# Patient Record
Sex: MALE | Race: AMERICAN INDIAN OR ALASKA NATIVE
[De-identification: names, ages, dates, MRNs, and addresses within clinical notes are randomized per-mention and may not be internally consistent; named-entity substitution may affect disease eponyms.]

---

## 2022-08-15 ENCOUNTER — HOSPITAL ENCOUNTER (EMERGENCY)
Dept: HOSPITAL 95 - ER | Age: 67
Discharge: HOME | End: 2022-08-15
Payer: MEDICARE

## 2022-08-15 VITALS — HEIGHT: 70 IN | BODY MASS INDEX: 42.95 KG/M2 | WEIGHT: 300.01 LBS

## 2022-08-15 DIAGNOSIS — F17.210: ICD-10-CM

## 2022-08-15 DIAGNOSIS — G47.30: ICD-10-CM

## 2022-08-15 DIAGNOSIS — J44.9: Primary | ICD-10-CM

## 2022-08-15 DIAGNOSIS — Z20.822: ICD-10-CM

## 2022-08-15 LAB
ALBUMIN SERPL BCP-MCNC: 3.6 G/DL (ref 3.4–5)
ALBUMIN/GLOB SERPL: 0.9 {RATIO} (ref 0.8–1.8)
ALT SERPL W P-5'-P-CCNC: 114 U/L (ref 12–78)
ANION GAP SERPL CALCULATED.4IONS-SCNC: 6 MMOL/L (ref 6–16)
AST SERPL W P-5'-P-CCNC: 58 U/L (ref 12–37)
BASOPHILS # BLD AUTO: 0.04 K/MM3 (ref 0–0.23)
BASOPHILS NFR BLD AUTO: 1 % (ref 0–2)
BILIRUB SERPL-MCNC: 0.5 MG/DL (ref 0.1–1)
BUN SERPL-MCNC: 36 MG/DL (ref 8–24)
CALCIUM SERPL-MCNC: 8.8 MG/DL (ref 8.5–10.1)
CHLORIDE SERPL-SCNC: 111 MMOL/L (ref 98–108)
CO2 SERPL-SCNC: 22 MMOL/L (ref 21–32)
CREAT SERPL-MCNC: 1.43 MG/DL (ref 0.6–1.2)
DEPRECATED RDW RBC AUTO: 48.6 FL (ref 35.1–46.3)
EOSINOPHIL # BLD AUTO: 0.14 K/MM3 (ref 0–0.68)
EOSINOPHIL NFR BLD AUTO: 2 % (ref 0–6)
ERYTHROCYTE [DISTWIDTH] IN BLOOD BY AUTOMATED COUNT: 14.4 % (ref 11.7–14.2)
FLUAV RNA SPEC QL NAA+PROBE: NEGATIVE
FLUBV RNA SPEC QL NAA+PROBE: NEGATIVE
GLOBULIN SER CALC-MCNC: 3.9 G/DL (ref 2.2–4)
GLUCOSE SERPL-MCNC: 113 MG/DL (ref 70–99)
HCT VFR BLD AUTO: 42.1 % (ref 37–53)
HGB BLD-MCNC: 13.6 G/DL (ref 13.5–17.5)
IMM GRANULOCYTES # BLD AUTO: 0.02 K/MM3 (ref 0–0.1)
IMM GRANULOCYTES NFR BLD AUTO: 0 % (ref 0–1)
LYMPHOCYTES # BLD AUTO: 1.38 K/MM3 (ref 0.84–5.2)
LYMPHOCYTES NFR BLD AUTO: 18 % (ref 21–46)
MCHC RBC AUTO-ENTMCNC: 32.3 G/DL (ref 31.5–36.5)
MCV RBC AUTO: 93 FL (ref 80–100)
MONOCYTES # BLD AUTO: 0.62 K/MM3 (ref 0.16–1.47)
MONOCYTES NFR BLD AUTO: 8 % (ref 4–13)
NEUTROPHILS # BLD AUTO: 5.57 K/MM3 (ref 1.96–9.15)
NEUTROPHILS NFR BLD AUTO: 72 % (ref 41–73)
NRBC # BLD AUTO: 0 K/MM3 (ref 0–0.02)
NRBC BLD AUTO-RTO: 0 /100 WBC (ref 0–0.2)
PLATELET # BLD AUTO: 243 K/MM3 (ref 150–400)
POTASSIUM SERPL-SCNC: 4.8 MMOL/L (ref 3.5–5.5)
PROT SERPL-MCNC: 7.5 G/DL (ref 6.4–8.2)
RSV RNA SPEC QL NAA+PROBE: NEGATIVE
SARS-COV-2 RNA RESP QL NAA+PROBE: NEGATIVE
SODIUM SERPL-SCNC: 139 MMOL/L (ref 136–145)

## 2022-09-16 ENCOUNTER — HOSPITAL ENCOUNTER (EMERGENCY)
Dept: HOSPITAL 95 - ER | Age: 67
Discharge: HOME | End: 2022-09-16
Payer: MEDICARE

## 2022-09-16 VITALS — WEIGHT: 315 LBS | BODY MASS INDEX: 46.65 KG/M2 | HEIGHT: 69 IN

## 2022-09-16 DIAGNOSIS — F10.129: Primary | ICD-10-CM

## 2022-09-16 DIAGNOSIS — F17.210: ICD-10-CM

## 2022-09-26 ENCOUNTER — HOSPITAL ENCOUNTER (INPATIENT)
Dept: HOSPITAL 95 - ER | Age: 67
LOS: 1 days | DRG: 871 | End: 2022-09-27
Attending: STUDENT IN AN ORGANIZED HEALTH CARE EDUCATION/TRAINING PROGRAM | Admitting: HOSPITALIST
Payer: MEDICARE

## 2022-09-26 VITALS — WEIGHT: 272.05 LBS | HEIGHT: 69 IN | BODY MASS INDEX: 40.29 KG/M2

## 2022-09-26 DIAGNOSIS — J96.01: ICD-10-CM

## 2022-09-26 DIAGNOSIS — R73.03: ICD-10-CM

## 2022-09-26 DIAGNOSIS — I50.9: ICD-10-CM

## 2022-09-26 DIAGNOSIS — R65.21: ICD-10-CM

## 2022-09-26 DIAGNOSIS — F17.210: ICD-10-CM

## 2022-09-26 DIAGNOSIS — I48.91: ICD-10-CM

## 2022-09-26 DIAGNOSIS — Z66: ICD-10-CM

## 2022-09-26 DIAGNOSIS — R74.8: ICD-10-CM

## 2022-09-26 DIAGNOSIS — F15.10: ICD-10-CM

## 2022-09-26 DIAGNOSIS — F10.10: ICD-10-CM

## 2022-09-26 DIAGNOSIS — I11.0: ICD-10-CM

## 2022-09-26 DIAGNOSIS — E66.9: ICD-10-CM

## 2022-09-26 DIAGNOSIS — Z20.822: ICD-10-CM

## 2022-09-26 DIAGNOSIS — Z78.1: ICD-10-CM

## 2022-09-26 DIAGNOSIS — Z91.19: ICD-10-CM

## 2022-09-26 DIAGNOSIS — N17.9: ICD-10-CM

## 2022-09-26 DIAGNOSIS — R77.8: ICD-10-CM

## 2022-09-26 DIAGNOSIS — G93.40: ICD-10-CM

## 2022-09-26 DIAGNOSIS — A41.51: Primary | ICD-10-CM

## 2022-09-26 LAB
ALBUMIN SERPL BCP-MCNC: 3.2 G/DL (ref 3.4–5)
ALBUMIN/GLOB SERPL: 0.8 {RATIO} (ref 0.8–1.8)
ALT SERPL W P-5'-P-CCNC: 227 U/L (ref 12–78)
ANION GAP SERPL CALCULATED.4IONS-SCNC: 8 MMOL/L (ref 6–16)
AST SERPL W P-5'-P-CCNC: 145 U/L (ref 12–37)
BASOPHILS # BLD AUTO: 0.06 K/MM3 (ref 0–0.23)
BASOPHILS NFR BLD AUTO: 1 % (ref 0–2)
BILIRUB SERPL-MCNC: 1.3 MG/DL (ref 0.1–1)
BUN SERPL-MCNC: 57 MG/DL (ref 8–24)
CALCIUM SERPL-MCNC: 8.5 MG/DL (ref 8.5–10.1)
CHLORIDE SERPL-SCNC: 108 MMOL/L (ref 98–108)
CO2 SERPL-SCNC: 21 MMOL/L (ref 21–32)
CREAT SERPL-MCNC: 2.14 MG/DL (ref 0.6–1.2)
DEPRECATED RDW RBC AUTO: 49.7 FL (ref 35.1–46.3)
EOSINOPHIL # BLD AUTO: 0.02 K/MM3 (ref 0–0.68)
EOSINOPHIL NFR BLD AUTO: 0 % (ref 0–6)
ERYTHROCYTE [DISTWIDTH] IN BLOOD BY AUTOMATED COUNT: 14.6 % (ref 11.7–14.2)
GLOBULIN SER CALC-MCNC: 3.9 G/DL (ref 2.2–4)
GLUCOSE SERPL-MCNC: 94 MG/DL (ref 70–99)
HCT VFR BLD AUTO: 44.1 % (ref 37–53)
HGB BLD-MCNC: 13.9 G/DL (ref 13.5–17.5)
IMM GRANULOCYTES # BLD AUTO: 0.02 K/MM3 (ref 0–0.1)
IMM GRANULOCYTES NFR BLD AUTO: 0 % (ref 0–1)
LYMPHOCYTES # BLD AUTO: 1.22 K/MM3 (ref 0.84–5.2)
LYMPHOCYTES NFR BLD AUTO: 12 % (ref 21–46)
MAGNESIUM SERPL-MCNC: 2.3 MG/DL (ref 1.6–2.4)
MCHC RBC AUTO-ENTMCNC: 31.5 G/DL (ref 31.5–36.5)
MCV RBC AUTO: 92 FL (ref 80–100)
MONOCYTES # BLD AUTO: 1.01 K/MM3 (ref 0.16–1.47)
MONOCYTES NFR BLD AUTO: 10 % (ref 4–13)
NEUTROPHILS # BLD AUTO: 8.15 K/MM3 (ref 1.96–9.15)
NEUTROPHILS NFR BLD AUTO: 78 % (ref 41–73)
NRBC # BLD AUTO: 0 K/MM3 (ref 0–0.02)
NRBC BLD AUTO-RTO: 0 /100 WBC (ref 0–0.2)
PH BLDV: 7.31 [PH] (ref 7.34–7.37)
PLATELET # BLD AUTO: 290 K/MM3 (ref 150–400)
POTASSIUM SERPL-SCNC: 5.5 MMOL/L (ref 3.5–5.5)
PROT SERPL-MCNC: 7.1 G/DL (ref 6.4–8.2)
SODIUM SERPL-SCNC: 137 MMOL/L (ref 136–145)

## 2022-09-26 PROCEDURE — C1751 CATH, INF, PER/CENT/MIDLINE: HCPCS

## 2022-09-26 PROCEDURE — A9270 NON-COVERED ITEM OR SERVICE: HCPCS

## 2022-09-27 LAB
ALBUMIN SERPL BCP-MCNC: 2.5 G/DL (ref 3.4–5)
ALBUMIN/GLOB SERPL: 0.8 {RATIO} (ref 0.8–1.8)
ALT SERPL W P-5'-P-CCNC: 277 U/L (ref 12–78)
ANION GAP SERPL CALCULATED.4IONS-SCNC: 8 MMOL/L (ref 6–16)
AST SERPL W P-5'-P-CCNC: 298 U/L (ref 12–37)
BASOPHILS # BLD AUTO: 0.04 K/MM3 (ref 0–0.23)
BASOPHILS NFR BLD AUTO: 0 % (ref 0–2)
BILIRUB SERPL-MCNC: 2.3 MG/DL (ref 0.1–1)
BUN SERPL-MCNC: 52 MG/DL (ref 8–24)
CALCIUM SERPL-MCNC: 6.7 MG/DL (ref 8.5–10.1)
CHLORIDE SERPL-SCNC: 114 MMOL/L (ref 98–108)
CO2 SERPL-SCNC: 18 MMOL/L (ref 21–32)
CREAT SERPL-MCNC: 2.12 MG/DL (ref 0.6–1.2)
DEPRECATED RDW RBC AUTO: 52.3 FL (ref 35.1–46.3)
EOSINOPHIL # BLD AUTO: 0.01 K/MM3 (ref 0–0.68)
EOSINOPHIL NFR BLD AUTO: 0 % (ref 0–6)
ERYTHROCYTE [DISTWIDTH] IN BLOOD BY AUTOMATED COUNT: 14.7 % (ref 11.7–14.2)
FLUAV RNA SPEC QL NAA+PROBE: NEGATIVE
FLUBV RNA SPEC QL NAA+PROBE: NEGATIVE
GLOBULIN SER CALC-MCNC: 3.3 G/DL (ref 2.2–4)
GLUCOSE SERPL-MCNC: 60 MG/DL (ref 70–99)
HCT VFR BLD AUTO: 47.2 % (ref 37–53)
HGB BLD-MCNC: 14.1 G/DL (ref 13.5–17.5)
IMM GRANULOCYTES # BLD AUTO: 0.04 K/MM3 (ref 0–0.1)
IMM GRANULOCYTES NFR BLD AUTO: 0 % (ref 0–1)
LEUKOCYTE ESTERASE UR QL STRIP: (no result)
LYMPHOCYTES # BLD AUTO: 0.75 K/MM3 (ref 0.84–5.2)
LYMPHOCYTES NFR BLD AUTO: 7 % (ref 21–46)
MCHC RBC AUTO-ENTMCNC: 29.9 G/DL (ref 31.5–36.5)
MCV RBC AUTO: 97 FL (ref 80–100)
MONOCYTES # BLD AUTO: 0.78 K/MM3 (ref 0.16–1.47)
MONOCYTES NFR BLD AUTO: 7 % (ref 4–13)
NEUTROPHILS # BLD AUTO: 8.91 K/MM3 (ref 1.96–9.15)
NEUTROPHILS NFR BLD AUTO: 85 % (ref 41–73)
NRBC # BLD AUTO: 0 K/MM3 (ref 0–0.02)
NRBC BLD AUTO-RTO: 0 /100 WBC (ref 0–0.2)
PH BLDA: 7.25 [PH] (ref 7.35–7.45)
PLATELET # BLD AUTO: 290 K/MM3 (ref 150–400)
POTASSIUM SERPL-SCNC: 6.1 MMOL/L (ref 3.5–5.5)
PROT SERPL-MCNC: 5.8 G/DL (ref 6.4–8.2)
PROT UR STRIP-MCNC: (no result) MG/DL
RBC #/AREA URNS HPF: (no result) /HPF (ref 0–2)
RSV RNA SPEC QL NAA+PROBE: NEGATIVE
SARS-COV-2 RNA RESP QL NAA+PROBE: NEGATIVE
SODIUM SERPL-SCNC: 140 MMOL/L (ref 136–145)
SP GR SPEC: 1.02 (ref 1–1.02)
URN SPEC COLLECT METH UR: (no result)
UROBILINOGEN UR STRIP-MCNC: (no result) MG/DL
WBC #/AREA URNS HPF: (no result) /HPF (ref 0–5)

## 2022-09-27 PROCEDURE — 0BH17EZ INSERTION OF ENDOTRACHEAL AIRWAY INTO TRACHEA, VIA NATURAL OR ARTIFICIAL OPENING: ICD-10-PCS

## 2022-09-27 PROCEDURE — 5A09357 ASSISTANCE WITH RESPIRATORY VENTILATION, LESS THAN 24 CONSECUTIVE HOURS, CONTINUOUS POSITIVE AIRWAY PRESSURE: ICD-10-PCS | Performed by: STUDENT IN AN ORGANIZED HEALTH CARE EDUCATION/TRAINING PROGRAM

## 2022-09-27 PROCEDURE — 5A1935Z RESPIRATORY VENTILATION, LESS THAN 24 CONSECUTIVE HOURS: ICD-10-PCS | Performed by: STUDENT IN AN ORGANIZED HEALTH CARE EDUCATION/TRAINING PROGRAM

## 2022-09-27 PROCEDURE — 3E033XZ INTRODUCTION OF VASOPRESSOR INTO PERIPHERAL VEIN, PERCUTANEOUS APPROACH: ICD-10-PCS | Performed by: STUDENT IN AN ORGANIZED HEALTH CARE EDUCATION/TRAINING PROGRAM

## 2022-09-27 PROCEDURE — 3E03329 INTRODUCTION OF OTHER ANTI-INFECTIVE INTO PERIPHERAL VEIN, PERCUTANEOUS APPROACH: ICD-10-PCS | Performed by: STUDENT IN AN ORGANIZED HEALTH CARE EDUCATION/TRAINING PROGRAM

## 2022-09-27 PROCEDURE — 02HV33Z INSERTION OF INFUSION DEVICE INTO SUPERIOR VENA CAVA, PERCUTANEOUS APPROACH: ICD-10-PCS

## 2022-09-27 NOTE — NUR
Pt is nonresponsive, several family members and friends are bedside. Pt's
brother requests, holy water for Scientologist Ep[employer]opalian. I perform the Episcopalian
according to the words that Father Gregorio had suggested to the family. I then
pray for the family, offer anticipatory grief support and conduct a life
review. Family respond well and show signs of being comforted.

## 2022-09-27 NOTE — NUR
Spiritual Care Support.
Pt. is intubated and will soon be moved to comfort care. Confrimed with nurse Louise Family requested a  come ASAP. After attempts to contact Father
Gregorio had been made from spiritual care, the [facility] emergency number was
called to request a  come to ICU2.
 
This  informed the Pts. family, and the family verbalized gratitude
for calling in a .

## 2022-09-27 NOTE — NUR
DR. LOYA IN UNIT AND UPDATED ON PATIENT STATUS.  DOCTOR STATED SHE WOULD BE
IN TO SPEAK WITH FAMILY SHORTLY AND THAT DR. MUNGUIA DOES NOT NEED CONSULTED AT
THIS TIME.

## 2022-09-27 NOTE — NUR
PT'S FAMILY REQUESTING HOLY WATER TO BAPTIZE PT. NURSING SUPERVISOR NOTIFIED
AND HAVING ON CALL  COME IN. PT IS MAXED OUT ON LEVOPHED, DOBUTAMINE
AND VASOPRESSIN WITH BP 72/50 MAP 58. DISCUSSED WITH THE 4 FAMILY MEMBERS AT
BEDSIDE INCLUDING PT'S SO ABOUT THEIR WISHES FOR PT'S CARE SINCE THEY STATE
THEY ARE WAITING FOR FAMILY TO COME FROM [place] AND THEN MOVIGN TOWARD
WITHDRAWING CARE. EXPLAINED OPTIONS OF ADDING ANOTHER PRESSOR OR CONTINUING
THE CURRENT CARE WITHOUT ESCALATING FURTHER AND ALL THE FAMILY AT THE BEDSIDE
AGREED TO CONTINUE CURRENT CARE WITHOUT FURTHER ESCALATION. EXPLAINED TO THE
FAMILY THAT WITHOUT ESCALATION, PT'S STATS IS VERY FRAGILE AND HE MAY NOT LIVE
UNTIL THE OTHER FAMILY GETS HERE AND THEY VERBALIZE UNDERSTANDING OF THAT AND
STILL WISH TO NOT ESCALATE CARE. DR. GANNON NOTIFIED OF PT'S STATUS REGARDING
PRESSORS AND DISCUSSION WITH FAMILY AND IS IN AGREEMENT.

## 2022-09-27 NOTE — NUR
SISTER ARRIVED FROM [place].  FAMILY HAD QUIET MOMENT WITH PATIENT AND THEN LET
NURSE KNOW THAT THEY WERE READY TO PROCEED WITH COMFORT CARE AND EXTUBATION.
PATIENT GIVEN PRN ATIVAN, EXTUBATED AT 1222. PRN MORPHINE GIVEN AND ALL DRIPS
TURNED OFF.  PATIENT BREATHING QUICKLY.  ANOTHER 1 MG PRN ATIVAN GIVEN.
PATIENT APPEARS COMFORTABLE AT THIS TIME.  ALL FAMILY AT BEDSIDE.  WILL
CONTINUE TO MONITOR.

## 2022-09-27 NOTE — NUR
FATHER ANGLE IN ROOM PERFORMING ANNOINTING OF THE SICK.  FAMILY AT BEDSIDE.
WAITING FOR SISTER TO ARRIVE AND THEN GOING COMFORT CARE.

## 2022-09-27 NOTE — NUR
NEW ADMIT TO ICU 2:
 
PT ARRIVED TO THE UNIT AT 0320. PT INTUBATED WITH NO SEDATION ON ARRIVAL.
VENT SETTINGS AS 16/500/8/100%. PT HYPOTENSIVE WITH LEVOPHED 20 MCG/MIN UPON
ARRIVAL. PT FEBRILE WITH TEMP .3 ON ARRIVAL. LUNG SOUNDS COARSE
THROUGHOUT WITH DIM BASES, LEFT SIDE MORE DIMINISHED THAN RIGHT. AFIB WITH
RVR, RATE 'S AND SBP DIPPING INTO 50-70. DR SHEPPARD AT [facility] TO
INSERT A CENTRAL LINE IN THE RIJ; COMPLETED AT 0358 AND CONFIMRED WITH CXR. AT
THIS TIME LEVOPHED WAS PLACED ON THE RIJ CENTRAL LINE AND TITRATED UP TO 30.
VASO AND DOBUTAMINE ORDERS RECIEVED AND STARTED RIGHT AWAY TO STABILIZE BP.
SBP CAME UP -110'S BUT THEN CAME BACK DOWN TO THE 60'S. MEDICATION
TITRATED, SEE FLOW SHEET. POOR SPO2 READINGS SINCE ARRIVAL; RT VERN BAGGING PT
SINCE 0420 TO IMPROVE O2 LEVELS. STILL NO GOOD SPO2 READINGS ACHIEVED. OGT
INSERTED AFTER CENTRAL LINE INSERTIONS AND IS CLAMPED; PLACEMENT CONFIRMED
WITH CXR. TEMP WANG PLACED IN THE ED THAT IS PATENT AND DRAINING TO GRAVITY;
URINE SUSAN AND CLEAR. PT HAS DISTENDED ABD THAT IS FIRM; BOWEL SOUNDS
HYPOACTIVE IN ALL QUADRANTS. WHEN PT ARRIVED MOTTLING UP TO THE PT'S UPPER
THIGHS, ADB AND FACE NOTED. PT IS COLD TO THE TOUCH. DIFFICULTY FINDING
PERIPHREAL PULSES; DOPPLER USED TO FIND CAROTID PULSE PERIODICALLY. PRIOR TO
INITIATING SEDATION, PT WAS OPENING EYES TO VERBAL STIMULI. AFTER STARTING
SEDATIONS, PT NOT RESPONSIVE AT THIS TIME. PT HAS VERSED GTT AT 2 MG/HR FOR
SEDATION AT THIS TIME. S/O JR AT BEDSIDE AT 0530 AND UPDATED ON PT'S
STATUS SINCE ARRIVAL TO UNIT. ECHO TECH AT BEDSIDE AT 0538.
 
WILL CONTINUE TO MONITOR CLOSELY UNIL REPORT GIVEN TO ONCOMING RN.

## 2022-09-27 NOTE — NUR
INITIAL ASSESSMENT
  PATIENT INTUBATED ON ON VERSED AT 2 MG/ HOUR.  PATIENT RESPONDS TO ORAL CARE
WITH GRIMACING OF FACE.  PATIENT DOES NOT MOVE EXTREMITIES TO NOXIOUS STIMULI.
 PUPILS PINPOINT.  PATIENT HAS TEMP .3 DEGREES FAHRENHEIT.  PATIENT HAS
+ COUGH AND GAG.  PATIENT ON VENT SETTINGS OF AC 16, , PEEP 10 %
FIO2.  ETT 8.0 AND 25 AT THE LIP.  LUNGS CLEAR IN UPPER LOBES AND DIMINISHED
IN LOWER LOBES.  PATIENT IN A. FIB, HR 90S TO LOW 100S. MAP IN THE 50S.
DOBUTAMINE AT 20 MCG/ KG/ MINUTE, VASOPRESSIN AT 0.04 UNITS/ MINUTE AND
LEVOPHED AT 35 MCG/ MINUTE.  1+ EDEMA NOTED TO BLES.  VERY FAINT DOPPLER
PULSES IN BILAT FEET.  FAINT PULSES IN RADIAL SITES.  CAP REFILL GREATER THAN
3 SECONDS IN BILAT TOES.  SKIN COOL AND PATIENT MOTTLED FROM TOES TO ABD.  ABD
DISTENDED, FIRM, WITH HYPOACTIVE BOWEL SOUNDS NOTED.  OG IN PLACE; CLAMPED.
DATE OF LAST BM UNKNOWN.  WANG DRAINING SUSAN COLORED URINE.  BED LOW.
MULTIPLE FAMILY MEMBERS AT BEDSIDE.  WILL CONTINUE TO MONITOR FREQUENTLY
THROUGHOUT SHIFT.